# Patient Record
Sex: FEMALE | Race: WHITE | NOT HISPANIC OR LATINO | Employment: UNEMPLOYED | URBAN - METROPOLITAN AREA
[De-identification: names, ages, dates, MRNs, and addresses within clinical notes are randomized per-mention and may not be internally consistent; named-entity substitution may affect disease eponyms.]

---

## 2018-10-29 ENCOUNTER — OFFICE VISIT (OUTPATIENT)
Dept: INTERNAL MEDICINE | Facility: CLINIC | Age: 43
End: 2018-10-29
Payer: COMMERCIAL

## 2018-10-29 VITALS
DIASTOLIC BLOOD PRESSURE: 88 MMHG | OXYGEN SATURATION: 98 % | TEMPERATURE: 99 F | SYSTOLIC BLOOD PRESSURE: 134 MMHG | BODY MASS INDEX: 26.31 KG/M2 | HEART RATE: 80 BPM | WEIGHT: 154.13 LBS | HEIGHT: 64 IN

## 2018-10-29 DIAGNOSIS — Z23 NEED FOR PROPHYLACTIC VACCINATION AND INOCULATION AGAINST INFLUENZA: ICD-10-CM

## 2018-10-29 DIAGNOSIS — N63.20 LEFT BREAST MASS: ICD-10-CM

## 2018-10-29 DIAGNOSIS — N61.1 ABSCESS OF RIGHT BREAST: Primary | ICD-10-CM

## 2018-10-29 PROCEDURE — 90471 IMMUNIZATION ADMIN: CPT | Mod: S$GLB,,, | Performed by: INTERNAL MEDICINE

## 2018-10-29 PROCEDURE — 3008F BODY MASS INDEX DOCD: CPT | Mod: CPTII,S$GLB,, | Performed by: INTERNAL MEDICINE

## 2018-10-29 PROCEDURE — 99202 OFFICE O/P NEW SF 15 MIN: CPT | Mod: 25,S$GLB,, | Performed by: INTERNAL MEDICINE

## 2018-10-29 PROCEDURE — 90686 IIV4 VACC NO PRSV 0.5 ML IM: CPT | Mod: S$GLB,,, | Performed by: INTERNAL MEDICINE

## 2018-10-29 PROCEDURE — 99999 PR PBB SHADOW E&M-NEW PATIENT-LVL III: CPT | Mod: PBBFAC,,, | Performed by: INTERNAL MEDICINE

## 2018-10-29 RX ORDER — SULFAMETHOXAZOLE AND TRIMETHOPRIM 800; 160 MG/1; MG/1
1 TABLET ORAL 2 TIMES DAILY
Qty: 14 TABLET | Refills: 0 | Status: SHIPPED | OUTPATIENT
Start: 2018-10-29 | End: 2018-11-05

## 2018-10-29 NOTE — PROGRESS NOTES
Subjective:       Patient ID: Abril Patrick is a 43 y.o. female.    Chief Complaint: To discuss issue with breast    HPI  Pt concerned about a tender spot on her R breast x 3-4 days.  No drainage.  Also c/o of a tnder mass in her L breast perhaps growing over 6 months.  Has never had a mammogram.  Review of Systems   All other systems reviewed and are negative.      Objective:      Physical Exam   Skin:   R breast with a drained abscess at 9:00.  L breast with a large mass at 12:00       Assessment:       1. Abscess of right breast    2. Left breast mass    3. Need for prophylactic vaccination and inoculation against influenza        Plan:       Abril was seen today for to discuss issue with breast.    Diagnoses and all orders for this visit:    Abscess of right breast  -     sulfamethoxazole-trimethoprim 800-160mg (BACTRIM DS) 800-160 mg Tab; Take 1 tablet by mouth 2 (two) times daily. for 7 days    Left breast mass  -     Mammo Digital Screening Bilat; Future    Need for prophylactic vaccination and inoculation against influenza  -     Influenza - Quadrivalent (3 years & older) (PF)      Follow-up if symptoms worsen or fail to improve.

## 2018-10-30 ENCOUNTER — HOSPITAL ENCOUNTER (OUTPATIENT)
Dept: RADIOLOGY | Facility: HOSPITAL | Age: 43
Discharge: HOME | End: 2018-10-30
Attending: INTERNAL MEDICINE
Payer: COMMERCIAL

## 2018-10-30 DIAGNOSIS — N63.20 LEFT BREAST MASS: ICD-10-CM

## 2018-10-30 DIAGNOSIS — N63.0 BREAST MASS: ICD-10-CM

## 2018-10-31 ENCOUNTER — TELEPHONE (OUTPATIENT)
Dept: RADIOLOGY | Facility: HOSPITAL | Age: 43
End: 2018-10-31

## 2018-11-07 ENCOUNTER — HOSPITAL ENCOUNTER (OUTPATIENT)
Dept: RADIOLOGY | Facility: HOSPITAL | Age: 43
Discharge: HOME | End: 2018-11-07
Attending: INTERNAL MEDICINE
Payer: COMMERCIAL

## 2018-11-07 DIAGNOSIS — N63.0 BREAST MASS: ICD-10-CM

## 2018-11-07 PROCEDURE — 77062 BREAST TOMOSYNTHESIS BI: CPT | Mod: 26,,, | Performed by: RADIOLOGY

## 2018-11-07 PROCEDURE — 77066 DX MAMMO INCL CAD BI: CPT | Mod: 26,,, | Performed by: RADIOLOGY

## 2018-11-07 PROCEDURE — 76642 ULTRASOUND BREAST LIMITED: CPT | Mod: TC,LT

## 2018-11-07 PROCEDURE — 77066 DX MAMMO INCL CAD BI: CPT | Mod: TC

## 2018-11-07 PROCEDURE — 76642 ULTRASOUND BREAST LIMITED: CPT | Mod: 26,LT,, | Performed by: RADIOLOGY
